# Patient Record
Sex: FEMALE | Employment: OTHER | URBAN - METROPOLITAN AREA
[De-identification: names, ages, dates, MRNs, and addresses within clinical notes are randomized per-mention and may not be internally consistent; named-entity substitution may affect disease eponyms.]

---

## 2017-08-16 ENCOUNTER — OFFICE VISIT (OUTPATIENT)
Dept: CARDIOTHORACIC SURGERY | Age: 72
End: 2017-08-16

## 2017-08-16 VITALS
WEIGHT: 167 LBS | OXYGEN SATURATION: 98 % | HEART RATE: 90 BPM | RESPIRATION RATE: 16 BRPM | TEMPERATURE: 98.9 F | BODY MASS INDEX: 30.73 KG/M2 | SYSTOLIC BLOOD PRESSURE: 102 MMHG | HEIGHT: 62 IN | DIASTOLIC BLOOD PRESSURE: 64 MMHG

## 2017-08-16 DIAGNOSIS — I42.2 ASYMMETRIC SEPTAL HYPERTROPHY (HCC): Primary | ICD-10-CM

## 2017-08-16 RX ORDER — VITAMIN E 1000 UNIT
CAPSULE ORAL
COMMUNITY

## 2017-08-16 RX ORDER — MELATONIN
DAILY
COMMUNITY

## 2017-08-16 RX ORDER — TRIAMCINOLONE ACETONIDE 55 UG/1
2 SPRAY, METERED NASAL DAILY
COMMUNITY

## 2017-08-16 RX ORDER — CHOLECALCIFEROL (VITAMIN D3) 125 MCG
100 CAPSULE ORAL DAILY
COMMUNITY

## 2017-08-16 RX ORDER — MULTIVITAMIN
1000 TABLET ORAL DAILY
COMMUNITY

## 2017-08-16 RX ORDER — METOPROLOL SUCCINATE 50 MG/1
50 TABLET, EXTENDED RELEASE ORAL 2 TIMES DAILY
COMMUNITY

## 2017-08-16 RX ORDER — ACETAMINOPHEN 500 MG
TABLET ORAL
COMMUNITY

## 2017-08-16 RX ORDER — LANOLIN ALCOHOL/MO/W.PET/CERES
1000 CREAM (GRAM) TOPICAL DAILY
COMMUNITY

## 2017-08-16 RX ORDER — MULTIVITAMIN WITH IRON
1 TABLET ORAL DAILY
COMMUNITY

## 2017-08-16 NOTE — PROGRESS NOTES
I had the pleasure of seeing Ms. Husain in the valve clinic earlier today with Ms. Roger Giordano NP - please see her note for details. She has symptomatic hypertrophic obstructive cardiomyopathy. I discussed options with her, including surgical myomectomy, alcohol septal ablation, or pacing. Recommended a pacing study and diagnostic coronary angiography to see if her septal anatomy would be amenable to ablation - and she would like to have this study all done at [facility] with Dr. Irvin Spicer, so will set up.

## 2017-08-17 NOTE — PROGRESS NOTES
Patient: Camron Husain   Age: 73 y.o. Patient Care Team:  Eddie Vargas MD as PCP - General (Family Practice)      PCP: Eddie Vargas MD    Cardiologist: Katelyn Cornejo    Diagnosis/Reason for Consultation: The encounter diagnosis was Asymmetric septal hypertrophy [facility]). Problem List: There is no problem list on file for this patient. HPI: 73 y.o.  female with PMHx of Asymmetric Septal Hypertrophy, GERD, pseudodiverticulum of rectum, HLD, Vit D Deficiency, conjunctival xerosis and considerable allergic rhinitis that is referred to the [address] [place] [address] by Dr. Katelyn Cornejo for interventional evaluation of HOCM. Ms. Marii Nix reports a long standing hx of variable activity restrictions d/t SOB. She was unable to swim two uninterrupted laps as a child/young adult but was an avid ballerina. Prior to getting sick with mononucleosis and anemia last yr, she was able to walk up to 3 miles on a flat surface at a normal pace w/o issue. She has known about her murmur for several yrs when she was dx by a [facility] pharmacist when she went to get her flu shot. At the time of her illness last yr, she underwent a TTE that revealed her septal hypertrophy which is when she was referred to Dr. Katelyn Cornejo. Over the past yr, as Ms. Husain has recovered from her mono, she states she never has really gotten 'all better.'  She still has some SOB/BACA with walking on a flat surface at a slow pace and actually had to stop twice today between the parking deck and our office. These episodes are not associated with any CP, chest tightness, lightheadedness, syncope, or falls. She does sleep on two pillows but blames this on considerable nasal congestion that is exacerbated with lying down and she denies PND and blames this too on her nasal congestion as she is not a mouth breather. Re: her SOB, Ms. Husain states that it is much worse with heat and humidity and is more bothered by these environmental factors this yr than she ever has in the past.     Over the past yr, Dr. Derek Angelo has started her on a BB with increasing doses and as Ms. Husain felt that this was worsening her fatigue. When she saw her PCP in the past couple of months, he decreased her BB a bit and added diltiazem but Ms. Husain denies any improvement in her energy level or SOB with these changes. She has never been hospitalized for cardiac reasons and her last hospitalization was in 2007 with a 'slipped disc.'     She denies any chest surgery/trauma/XRT or any bleeding disorders or episodes. She has never had a cardiac cath, CT or MRI. She has considerable claustrophobia and states she is unsure if she could tolerate a MRI and would like to avoid it at all costs, if at all possible. Ms. Manish Hernandez lives with her  of many yrs in a single family home. She is independent in all ADLs, medication administration and medical decision making. She does not use any assistive devices. NYHA Classification: II   Class II (Mild): Slight limitation of physical activity. Comfortable at rest, but ordinary physical activity results in fatigue, palpitation, or dyspnea. Angina Classification: 0   Class 0: No symptoms    Past Medical History:   Diagnosis Date    CAD (coronary artery disease)     HOCM    Congestive heart failure (HCC)     GERD (gastroesophageal reflux disease)     Hyperlipidemia     Ill-defined condition     high cholesterol, anemic, endometriosis, mono       Past Surgical History:   Procedure Laterality Date    COLONOSCOPY N/A 11/16/2016    COLONOSCOPY, POLYPECTOMY, SCLEROINJECTION NS performed by Maki Storey MD at [address] [facility] GYN      fibroids removed x2 1981      Social History   Substance Use Topics    Smoking status: Never Smoker    Smokeless tobacco: Not on file    Alcohol use Yes      No family history on file. Prior to Admission medications    Medication Sig Start Date End Date Taking?  Authorizing Provider   dilTIAZem ER (CARDIZEM LA) 120 mg tablet Take 120 mg by mouth daily. Yes Historical Provider   metoprolol succinate (TOPROL-XL) 50 mg XL tablet Take 50 mg by mouth two (2) times a day. Yes Historical Provider   GLUCOSAM/CLIVE-MSM1/C/DEMI/BOSW (OSTEO BI-FLEX TRIPLE STRENGTH PO) Take  by mouth daily. Yes Historical Provider   multivitamin with iron tablet Take 1 Tab by mouth daily. Yes Historical Provider   VITAMIN A PO Take 8,000 mg by mouth daily. Yes Historical Provider   cholecalciferol (VITAMIN D3) 1,000 unit tablet Take  by mouth daily. Yes Historical Provider   cyanocobalamin (VITAMIN B-12) 1,000 mcg tablet Take 1,000 mcg by mouth daily. Yes Historical Provider   zinc 50 mg tab tablet Take  by mouth daily. Yes Historical Provider   ascorbic acid, vitamin C, (VITAMIN C) 1,000 mg tablet Take  by mouth. Yes Historical Provider   Lactobacillus acidophilus (ACIDOPHILUS) cap Take 2 Caps by mouth daily. Yes Historical Provider   CALCIUM CITRATE, BULK, 1,200 mg by Mouth/Throat route daily. Yes Historical Provider   cinnamon bark (CINNAMON) 500 mg cap Take 1,000 mg by mouth daily. Yes Historical Provider   co-enzyme Q-10 (COQ-10) 100 mg capsule Take 100 mg by mouth daily. Yes Historical Provider   triamcinolone (NASACORT AQ) 55 mcg nasal inhaler 2 Sprays daily. Yes Historical Provider   peg 400-propylene glycol (SYSTANE, PROPYLENE GLYCOL,) 0.4-0.3 % drop Administer 1 Drop to both eyes daily. Yes Historical Provider   Cetirizine (ZYRTEC) 10 mg cap Take  by mouth daily as needed. Yes Historical Provider   acetaminophen (TYLENOL EXTRA STRENGTH) 500 mg tablet Take  by mouth every six (6) hours as needed for Pain. Yes Historical Provider   OXYMETAZOLINE HCL (AFRIN NA) by Nasal route. Yes Historical Provider   atorvastatin (LIPITOR) 80 mg tablet Take 80 mg by mouth daily. Yes Historical Provider   pantoprazole (PROTONIX) 40 mg granules for oral suspension 40 mg daily.    Yes Historical Provider   ferrous sulfate (IRON) 325 mg (65 mg iron) tablet Take  by mouth Daily (before breakfast). Historical Provider   omega-3 fatty acids-vitamin e (FISH OIL) 1,000 mg cap Take 1 Cap by mouth. Historical Provider       Allergies   Allergen Reactions    Sulfa (Sulfonamide Antibiotics) Shortness of Breath       Current Medications:   Current Outpatient Prescriptions   Medication Sig Dispense Refill    dilTIAZem ER (CARDIZEM LA) 120 mg tablet Take 120 mg by mouth daily.  metoprolol succinate (TOPROL-XL) 50 mg XL tablet Take 50 mg by mouth two (2) times a day.  GLUCOSAM/CILVE-MSM1/C/DEMI/BOSW (OSTEO BI-FLEX TRIPLE STRENGTH PO) Take  by mouth daily.  multivitamin with iron tablet Take 1 Tab by mouth daily.  VITAMIN A PO Take 8,000 mg by mouth daily.  cholecalciferol (VITAMIN D3) 1,000 unit tablet Take  by mouth daily.  cyanocobalamin (VITAMIN B-12) 1,000 mcg tablet Take 1,000 mcg by mouth daily.  zinc 50 mg tab tablet Take  by mouth daily.  ascorbic acid, vitamin C, (VITAMIN C) 1,000 mg tablet Take  by mouth.  Lactobacillus acidophilus (ACIDOPHILUS) cap Take 2 Caps by mouth daily.  CALCIUM CITRATE, BULK, 1,200 mg by Mouth/Throat route daily.  cinnamon bark (CINNAMON) 500 mg cap Take 1,000 mg by mouth daily.  co-enzyme Q-10 (COQ-10) 100 mg capsule Take 100 mg by mouth daily.  triamcinolone (NASACORT AQ) 55 mcg nasal inhaler 2 Sprays daily.  peg 400-propylene glycol (SYSTANE, PROPYLENE GLYCOL,) 0.4-0.3 % drop Administer 1 Drop to both eyes daily.  Cetirizine (ZYRTEC) 10 mg cap Take  by mouth daily as needed.  acetaminophen (TYLENOL EXTRA STRENGTH) 500 mg tablet Take  by mouth every six (6) hours as needed for Pain.  OXYMETAZOLINE HCL (AFRIN NA) by Nasal route.  atorvastatin (LIPITOR) 80 mg tablet Take 80 mg by mouth daily.  pantoprazole (PROTONIX) 40 mg granules for oral suspension 40 mg daily.       ferrous sulfate (IRON) 325 mg (65 mg iron) tablet Take  by mouth Daily (before breakfast).  omega-3 fatty acids-vitamin e (FISH OIL) 1,000 mg cap Take 1 Cap by mouth. Vitals: Blood pressure 102/64, pulse 90, temperature 98.9 °F (37.2 °C), temperature source Oral, resp. rate 16, height 5' 2\" (1.575 m), weight 167 lb (75.8 kg), SpO2 98 %. Allergies: is allergic to sulfa (sulfonamide antibiotics). Review of Systems: Pertinent Positives per HPI   [x]Total of 13 systems reviewed as follows:  Constitutional: Negative fever, negative chills  Eyes:   Negative for amauroses fugax  ENT:   Negative sore throat,oral absecess  Endocrine Negative for thyroid replacement Rx; goiter; DM  Respiratory:  Negative chronic cough,sputum production  Cards:   Negative for palpitations, lower extremity edema, varicosities, claudication  GI:   Negative for dysphagia, bleeding, nausea, vomiting, diarrhea, and abdominal pain  Genitourinary: Negative for frequency, dysuria  Integument:  Negative for rash and pruritus  Hematologic:  Negative for easy bruising; bleeding dyscarsia  Musculoskel: Negative for muscle weakness inhibiting ambulation  Neurological:  Negative for stroke, TIA, syncope, dizziness  Behavl/Psych: Negative for feelings of anxiety, depression     Cardiovascular Testing:   EKG: none    TTE 7/19/2017:  LVEF 55-60%, small LV with asymmetrical septal hypertrophy c/w HOCM and JAVID, trace AI, trace MR, trace TR, trace ND, Grade I Diastolic Dysfxn, mod dil LA, nrml RA & RV, RVSP 37 mmhg    Physical Exam:  General: Well nourished well groomed female appearing stated age accompanied by her   Neuro: A&OX3. ESQUIVEL. PERRL. Steady un-assisted gait  Head:Normocephalic. Atraumatic. Symmetrical  Neck: Trachea Midline  Resp: CTA B. No Adv BS/cough/sputum/tachypnea with seated conversation  CV: S1S2 RRR. GABRIEL VVI. No JVD/carotid bruits. Pink/warm/dry extremities. No LE peripheral edema  GI:Benign ab. Soft. NT/ND.  Active BS  : Voids  Integ: No obvious s/s of infection or breakdown  Musculo/Skeletal: FROM in all major joints. Good muscle tone    Clinic Evaluation:   KCCQ-12: scanned into EMR    Frality Survey:  Herman Natalia Index ADL - 6/6  scanned into EMR     Assessment/Plan:   1. HOCM - symptomatic. Potential for surgical myomectomy, alcohol septal ablation or pacing. Pt wishes to pursue eval/tx with Dr. Mag Rey at [facility]. Pt also requesting dx testing be obtained there as well. Will send records to Dr. Tiff Parker.   2. Further plan/care by Dr. Caryl Quijano